# Patient Record
Sex: FEMALE | Race: OTHER | NOT HISPANIC OR LATINO | URBAN - METROPOLITAN AREA
[De-identification: names, ages, dates, MRNs, and addresses within clinical notes are randomized per-mention and may not be internally consistent; named-entity substitution may affect disease eponyms.]

---

## 2020-11-06 ENCOUNTER — OUTPATIENT (OUTPATIENT)
Dept: OUTPATIENT SERVICES | Facility: HOSPITAL | Age: 45
LOS: 1 days | Discharge: HOME | End: 2020-11-06
Payer: COMMERCIAL

## 2020-11-06 VITALS
HEIGHT: 65 IN | SYSTOLIC BLOOD PRESSURE: 120 MMHG | TEMPERATURE: 98 F | OXYGEN SATURATION: 98 % | RESPIRATION RATE: 16 BRPM | DIASTOLIC BLOOD PRESSURE: 77 MMHG | WEIGHT: 131.84 LBS | HEART RATE: 69 BPM

## 2020-11-06 DIAGNOSIS — Z98.890 OTHER SPECIFIED POSTPROCEDURAL STATES: Chronic | ICD-10-CM

## 2020-11-06 DIAGNOSIS — Z98.82 BREAST IMPLANT STATUS: Chronic | ICD-10-CM

## 2020-11-06 LAB
BLD GP AB SCN SERPL QL: NEGATIVE — SIGNIFICANT CHANGE UP
RH IG SCN BLD-IMP: POSITIVE — SIGNIFICANT CHANGE UP

## 2020-11-06 PROCEDURE — 88304 TISSUE EXAM BY PATHOLOGIST: CPT | Mod: 26

## 2020-11-06 PROCEDURE — 88307 TISSUE EXAM BY PATHOLOGIST: CPT | Mod: 26

## 2020-11-06 RX ORDER — MAGNESIUM SULFATE 500 MG/ML
2 VIAL (ML) INJECTION
Refills: 0 | Status: COMPLETED | OUTPATIENT
Start: 2020-11-06 | End: 2020-11-06

## 2020-11-06 RX ORDER — KETOROLAC TROMETHAMINE 30 MG/ML
30 SYRINGE (ML) INJECTION EVERY 6 HOURS
Refills: 0 | Status: DISCONTINUED | OUTPATIENT
Start: 2020-11-07 | End: 2020-11-07

## 2020-11-06 RX ORDER — SODIUM CHLORIDE 9 MG/ML
1000 INJECTION, SOLUTION INTRAVENOUS
Refills: 0 | Status: DISCONTINUED | OUTPATIENT
Start: 2020-11-06 | End: 2020-11-07

## 2020-11-06 RX ORDER — ACETAMINOPHEN 500 MG
1000 TABLET ORAL EVERY 8 HOURS
Refills: 0 | Status: DISCONTINUED | OUTPATIENT
Start: 2020-11-07 | End: 2020-11-07

## 2020-11-06 RX ORDER — QUETIAPINE FUMARATE 200 MG/1
50 TABLET, FILM COATED ORAL EVERY 24 HOURS
Refills: 0 | Status: DISCONTINUED | OUTPATIENT
Start: 2020-11-06 | End: 2020-11-07

## 2020-11-06 RX ORDER — SIMETHICONE 80 MG/1
80 TABLET, CHEWABLE ORAL EVERY 6 HOURS
Refills: 0 | Status: DISCONTINUED | OUTPATIENT
Start: 2020-11-06 | End: 2020-11-07

## 2020-11-06 RX ORDER — HYDROMORPHONE HYDROCHLORIDE 2 MG/ML
0.5 INJECTION INTRAMUSCULAR; INTRAVENOUS; SUBCUTANEOUS
Refills: 0 | Status: DISCONTINUED | OUTPATIENT
Start: 2020-11-06 | End: 2020-11-07

## 2020-11-06 RX ORDER — ONDANSETRON 8 MG/1
8 TABLET, FILM COATED ORAL EVERY 8 HOURS
Refills: 0 | Status: DISCONTINUED | OUTPATIENT
Start: 2020-11-06 | End: 2020-11-07

## 2020-11-06 RX ORDER — ONDANSETRON 8 MG/1
4 TABLET, FILM COATED ORAL ONCE
Refills: 0 | Status: COMPLETED | OUTPATIENT
Start: 2020-11-06 | End: 2020-11-06

## 2020-11-06 RX ORDER — CLONAZEPAM 1 MG
1 TABLET ORAL EVERY 24 HOURS
Refills: 0 | Status: DISCONTINUED | OUTPATIENT
Start: 2020-11-06 | End: 2020-11-07

## 2020-11-06 RX ADMIN — Medication 100 GRAM(S): at 16:00

## 2020-11-06 RX ADMIN — ONDANSETRON 4 MILLIGRAM(S): 8 TABLET, FILM COATED ORAL at 21:11

## 2020-11-06 RX ADMIN — Medication 100 GRAM(S): at 15:15

## 2020-11-06 RX ADMIN — HYDROMORPHONE HYDROCHLORIDE 0.5 MILLIGRAM(S): 2 INJECTION INTRAMUSCULAR; INTRAVENOUS; SUBCUTANEOUS at 19:45

## 2020-11-06 RX ADMIN — HYDROMORPHONE HYDROCHLORIDE 0.5 MILLIGRAM(S): 2 INJECTION INTRAMUSCULAR; INTRAVENOUS; SUBCUTANEOUS at 21:07

## 2020-11-06 RX ADMIN — HYDROMORPHONE HYDROCHLORIDE 0.5 MILLIGRAM(S): 2 INJECTION INTRAMUSCULAR; INTRAVENOUS; SUBCUTANEOUS at 19:37

## 2020-11-06 RX ADMIN — Medication 1 MILLIGRAM(S): at 22:42

## 2020-11-06 NOTE — BRIEF OPERATIVE NOTE - NSICDXBRIEFPROCEDURE_GEN_ALL_CORE_FT
PROCEDURES:  Suspension, ligament, uterosacral 06-Nov-2020 17:41:29  Julita Farfan  Cystoscopy 06-Nov-2020 17:40:53  Julita Farfan  Salpingectomy, right 06-Nov-2020 17:40:46  Julita Farfan  Vaginal hysterectomy with cystoscopy 06-Nov-2020 17:40:39  Julita Farfan

## 2020-11-06 NOTE — BRIEF OPERATIVE NOTE - OPERATION/FINDINGS
Cystoscopy performed at start of procedure, bilateral jets visualized. Bladder drained at start of case.   Vaginal hysterectomy and right salpingectomy, unable to visualize left fallopian tube. Normal appearing right and left ovary. Pedicles secured with Vicryl sutures throughout.  Good hemostasis confirmed. Uterosacral ligament suspension. Vaginal cuff closed with interrupted Vicryl sutures.  Cystoscopy performed at end of procedure bilateral jets visualized.  Bladder drained at end of case.   EBL 250cc  UOP 100cc  IVF 1200  Patient tolerated procedure.

## 2020-11-06 NOTE — H&P ADULT - ASSESSMENT
46 yo G0 presents for scheduled vaginal hysterectomy for dysfunctional uterine bleeding and symptomatic fibroids.   - Admit for procedure  - NPO  - IV fluids  - Consent obtained by Attending.

## 2020-11-06 NOTE — H&P ADULT - HISTORY OF PRESENT ILLNESS
46 yo G0 presents for scheduled vaginal hysterectomy for dysfunctional uterine bleeding and symptomatic fibroids. Patient feels well. She is anxious but ready for her surgery.     Denies chest pain, SOB, fevers, chills, headache, vaginal bleeding or any other complaints.

## 2020-11-06 NOTE — H&P ADULT - NSICDXPASTSURGICALHX_GEN_ALL_CORE_FT
PAST SURGICAL HISTORY:  History of abdominoplasty     History of breast augmentation 2017    History of rhinoplasty

## 2020-11-07 VITALS
HEART RATE: 70 BPM | SYSTOLIC BLOOD PRESSURE: 105 MMHG | TEMPERATURE: 98 F | RESPIRATION RATE: 15 BRPM | OXYGEN SATURATION: 95 % | DIASTOLIC BLOOD PRESSURE: 67 MMHG

## 2020-11-07 LAB
ANION GAP SERPL CALC-SCNC: 11 MMOL/L — SIGNIFICANT CHANGE UP (ref 5–17)
BUN SERPL-MCNC: 10 MG/DL — SIGNIFICANT CHANGE UP (ref 7–23)
CALCIUM SERPL-MCNC: 8 MG/DL — LOW (ref 8.4–10.5)
CHLORIDE SERPL-SCNC: 103 MMOL/L — SIGNIFICANT CHANGE UP (ref 96–108)
CO2 SERPL-SCNC: 21 MMOL/L — LOW (ref 22–31)
CREAT SERPL-MCNC: 0.72 MG/DL — SIGNIFICANT CHANGE UP (ref 0.5–1.3)
GLUCOSE SERPL-MCNC: 123 MG/DL — HIGH (ref 70–99)
HCT VFR BLD CALC: 33.1 % — LOW (ref 34.5–45)
HGB BLD-MCNC: 11.1 G/DL — LOW (ref 11.5–15.5)
MCHC RBC-ENTMCNC: 31.6 PG — SIGNIFICANT CHANGE UP (ref 27–34)
MCHC RBC-ENTMCNC: 33.5 GM/DL — SIGNIFICANT CHANGE UP (ref 32–36)
MCV RBC AUTO: 94.3 FL — SIGNIFICANT CHANGE UP (ref 80–100)
NRBC # BLD: 0 /100 WBCS — SIGNIFICANT CHANGE UP (ref 0–0)
PLATELET # BLD AUTO: 241 K/UL — SIGNIFICANT CHANGE UP (ref 150–400)
POTASSIUM SERPL-MCNC: 4.2 MMOL/L — SIGNIFICANT CHANGE UP (ref 3.5–5.3)
POTASSIUM SERPL-SCNC: 4.2 MMOL/L — SIGNIFICANT CHANGE UP (ref 3.5–5.3)
RBC # BLD: 3.51 M/UL — LOW (ref 3.8–5.2)
RBC # FLD: 11.7 % — SIGNIFICANT CHANGE UP (ref 10.3–14.5)
SODIUM SERPL-SCNC: 135 MMOL/L — SIGNIFICANT CHANGE UP (ref 135–145)
WBC # BLD: 16.12 K/UL — HIGH (ref 3.8–10.5)
WBC # FLD AUTO: 16.12 K/UL — HIGH (ref 3.8–10.5)

## 2020-11-07 PROCEDURE — 86900 BLOOD TYPING SEROLOGIC ABO: CPT

## 2020-11-07 PROCEDURE — 86901 BLOOD TYPING SEROLOGIC RH(D): CPT

## 2020-11-07 PROCEDURE — 80048 BASIC METABOLIC PNL TOTAL CA: CPT

## 2020-11-07 PROCEDURE — 36415 COLL VENOUS BLD VENIPUNCTURE: CPT

## 2020-11-07 PROCEDURE — 86850 RBC ANTIBODY SCREEN: CPT

## 2020-11-07 PROCEDURE — 88307 TISSUE EXAM BY PATHOLOGIST: CPT

## 2020-11-07 PROCEDURE — 85027 COMPLETE CBC AUTOMATED: CPT

## 2020-11-07 PROCEDURE — 58267 VAG HYST W/URINARY REPAIR: CPT

## 2020-11-07 PROCEDURE — 88304 TISSUE EXAM BY PATHOLOGIST: CPT

## 2020-11-07 RX ORDER — CLONAZEPAM 1 MG
1 TABLET ORAL
Qty: 0 | Refills: 0 | DISCHARGE
Start: 2020-11-07

## 2020-11-07 RX ORDER — TRAMADOL HYDROCHLORIDE 50 MG/1
1 TABLET ORAL
Qty: 9 | Refills: 0
Start: 2020-11-07 | End: 2020-11-09

## 2020-11-07 RX ORDER — ACETAMINOPHEN 500 MG
2 TABLET ORAL
Qty: 0 | Refills: 0 | DISCHARGE
Start: 2020-11-07

## 2020-11-07 RX ORDER — HYDROMORPHONE HYDROCHLORIDE 2 MG/ML
0.5 INJECTION INTRAMUSCULAR; INTRAVENOUS; SUBCUTANEOUS ONCE
Refills: 0 | Status: DISCONTINUED | OUTPATIENT
Start: 2020-11-07 | End: 2020-11-07

## 2020-11-07 RX ORDER — QUETIAPINE FUMARATE 200 MG/1
1 TABLET, FILM COATED ORAL
Qty: 0 | Refills: 0 | DISCHARGE
Start: 2020-11-07

## 2020-11-07 RX ORDER — SIMETHICONE 80 MG/1
1 TABLET, CHEWABLE ORAL
Qty: 0 | Refills: 0 | DISCHARGE
Start: 2020-11-07

## 2020-11-07 RX ADMIN — Medication 30 MILLIGRAM(S): at 00:35

## 2020-11-07 RX ADMIN — Medication 30 MILLIGRAM(S): at 06:30

## 2020-11-07 RX ADMIN — Medication 30 MILLIGRAM(S): at 00:45

## 2020-11-07 RX ADMIN — ONDANSETRON 8 MILLIGRAM(S): 8 TABLET, FILM COATED ORAL at 00:34

## 2020-11-07 RX ADMIN — HYDROMORPHONE HYDROCHLORIDE 0.5 MILLIGRAM(S): 2 INJECTION INTRAMUSCULAR; INTRAVENOUS; SUBCUTANEOUS at 02:06

## 2020-11-07 RX ADMIN — Medication 30 MILLIGRAM(S): at 12:10

## 2020-11-07 RX ADMIN — HYDROMORPHONE HYDROCHLORIDE 0.5 MILLIGRAM(S): 2 INJECTION INTRAMUSCULAR; INTRAVENOUS; SUBCUTANEOUS at 02:38

## 2020-11-07 RX ADMIN — Medication 30 MILLIGRAM(S): at 05:31

## 2020-11-07 RX ADMIN — QUETIAPINE FUMARATE 50 MILLIGRAM(S): 200 TABLET, FILM COATED ORAL at 00:58

## 2020-11-07 RX ADMIN — HYDROMORPHONE HYDROCHLORIDE 0.5 MILLIGRAM(S): 2 INJECTION INTRAMUSCULAR; INTRAVENOUS; SUBCUTANEOUS at 09:49

## 2020-11-07 RX ADMIN — HYDROMORPHONE HYDROCHLORIDE 0.5 MILLIGRAM(S): 2 INJECTION INTRAMUSCULAR; INTRAVENOUS; SUBCUTANEOUS at 09:34

## 2020-11-07 NOTE — PROGRESS NOTE ADULT - ASSESSMENT
Assessment and Plan:  46yo POD1 s/p TVH, RS, cysto, uterosacral ligament suspension, stable    Plan:  1. Vital signs stable, continue to monitor per protocol  2. Pain control tylenol/toradol  3. DVT prophylaxis: SCDs  4. CV: IVF  5. Pulm: Incentive spirometer (at least 10 times per hour while awake)   6. GI: Diet advanced to regular  7. : voiding spontaneously  8. Follow up labs: AM CBC  9. Activity:ambulate as tolerated

## 2020-11-07 NOTE — DISCHARGE NOTE NURSING/CASE MANAGEMENT/SOCIAL WORK - NSDCPNINST_GEN_ALL_CORE
Call Dr. Finkelstein if you have any questions or concerns. Due to pandemic corona virus- please continue proper handwashing/ hand hygiene, wearing mask, social distancing.

## 2020-11-07 NOTE — PROGRESS NOTE ADULT - SUBJECTIVE AND OBJECTIVE BOX
GYN PROGRESS NOTE    Patient evaluated at the bedside. No acute events.  Denies CP/SOB/dizziness/nausea/vomiting/abdominal pain/calf pain.  Pain well controlled on pain medications. Patient is ambulating independently, passing flatus and tolerating a regular diet.    O:   T(C): 36.9 (11-07-20 @ 04:57), Max: 37.3 (11-06-20 @ 19:30)  HR: 77 (11-07-20 @ 04:57) (61 - 89)  BP: 100/63 (11-07-20 @ 04:57) (100/63 - 125/67)  RR: 17 (11-07-20 @ 04:57) (8 - 20)  SpO2: 94% (11-07-20 @ 04:57) (93% - 100%)  Wt(kg): --    GEN: patient appears well  LUNGS: no respiratory distress  ABD: soft, nondistended, appropriately tender, +BS, no rebound or guarding  Pelvic: no vaginal bleeding  EXT: no calf tenderness        11-06 @ 07:01  -  11-07 @ 06:38  --------------------------------------------------------  IN: 810 mL / OUT: 750 mL / NET: 60 mL

## 2020-11-07 NOTE — DISCHARGE NOTE NURSING/CASE MANAGEMENT/SOCIAL WORK - PATIENT PORTAL LINK FT
You can access the FollowMyHealth Patient Portal offered by [facility] by registering at the following website: [email or web address]. By joining [facility]’s FollowMyHealth portal, you will also be able to view your health information using other applications (apps) compatible with our system.

## 2020-11-07 NOTE — DISCHARGE NOTE PROVIDER - NSDCMRMEDTOKEN_GEN_ALL_CORE_FT
acetaminophen 500 mg oral tablet: 2 tab(s) orally every 8 hours  clonazePAM 1 mg oral tablet: 1 tab(s) orally every 24 hours, As needed, Anxiety  QUEtiapine 50 mg oral tablet: 1 tab(s) orally every 24 hours, As needed, Insomnia  simethicone 80 mg oral tablet, chewable: 1 tab(s) orally every 6 hours, As needed, Gas  traMADol 50 mg oral tablet: 1 tab(s) orally every 8 hours MDD:3   acetaminophen 500 mg oral tablet: 2 tab(s) orally every 8 hours, As Needed  clonazePAM 1 mg oral tablet: 1 tab(s) orally every 24 hours, As needed, Anxiety  QUEtiapine 50 mg oral tablet: 1 tab(s) orally every 24 hours, As needed, Insomnia  simethicone 80 mg oral tablet, chewable: 1 tab(s) orally every 6 hours, As needed, Gas  traMADol 50 mg oral tablet: 1 tab(s) orally every 8 hours, As Needed MDD:3

## 2020-11-07 NOTE — PROGRESS NOTE ADULT - ASSESSMENT
46 yo POD0 s/p TVH, RS, cysto, uterosacral ligament suspension, doing well post op, clinically stable    Plan:  1. Vital signs stable, continue to monitor per protocol  2. Pain control tylenol/toradol  3. DVT prophylaxis: SCDs  4. CV: IVF  5. Pulm: Incentive spirometer (at least 10 times per hour while awake)   6. GI: Diet advanced to regular  7. : voiding spontaneously  8. Follow up labs: AM CBC  9. Activity: bedrest tonight

## 2020-11-07 NOTE — PROVIDER CONTACT NOTE (OTHER) - SITUATION
prescription medication Tramadol was picked up from [facility] and given to patient. Medication education given and had verbalized her understanding. Primary nurse Ban mathews.

## 2020-11-07 NOTE — PROGRESS NOTE ADULT - SUBJECTIVE AND OBJECTIVE BOX
GYN Post Op Check     Patient seen at bedside. Doing well.  Only c/o right sided thigh pain likely from positioning intraop. Pain controlled. Tolerating sips. Ambulated to bathroom and passed TOV.     Denies CP, palpitations, SOB, fever, chills, nausea, vomiting.    Vital Signs Last 24 Hrs  T(C): 36.8 (06 Nov 2020 23:31), Max: 37.3 (06 Nov 2020 19:30)  T(F): 98.3 (06 Nov 2020 23:31), Max: 99.2 (06 Nov 2020 19:30)  HR: 61 (06 Nov 2020 23:31) (61 - 89)  BP: 109/72 (06 Nov 2020 23:31) (104/65 - 125/67)  BP(mean): 78 (06 Nov 2020 21:53) (78 - 87)  RR: 17 (06 Nov 2020 23:31) (8 - 20)  SpO2: 99% (06 Nov 2020 23:31) (93% - 100%)    Physical Exam:  Gen: No Acute Distress  Pulm: Clear to auscultation bilaterally  GI: soft, appropriately tender, nondistended, decreased BS, no rebound, no guarding.  Pelvic: no vaginal bleeding  Ext: SCDs in place, wnl    I&O's Summary    06 Nov 2020 07:01  -  07 Nov 2020 00:01  --------------------------------------------------------  IN: 540 mL / OUT: 200 mL / NET: 340 mL      MEDICATIONS  (STANDING):  lactated ringers. 1000 milliLiter(s) (90 mL/Hr) IV Continuous <Continuous>    MEDICATIONS  (PRN):  clonazePAM  Tablet 1 milliGRAM(s) Oral every 24 hours PRN Anxiety  HYDROmorphone  Injectable 0.5 milliGRAM(s) IV Push every 15 minutes PRN Severe Pain (7 - 10)  ondansetron    Tablet 8 milliGRAM(s) Oral every 8 hours PRN Nausea and/or Vomiting  QUEtiapine 50 milliGRAM(s) Oral every 24 hours PRN Insomnia  simethicone 80 milliGRAM(s) Chew every 6 hours PRN Gas    Allergies    codeine (Anaphylaxis)  hazelnuts (Unknown)    Intolerances        LABS:

## 2020-11-07 NOTE — DISCHARGE NOTE PROVIDER - CARE PROVIDER_API CALL
Finkelstein, Seth A  OBSTETRICS AND GYNECOLOGY  [address] [place] [address]  Phone: [phone]  Fax: [phone]  Follow Up Time:

## 2020-11-07 NOTE — DISCHARGE NOTE PROVIDER - HOSPITAL COURSE
Patient is a 45 year old who presented for scheduled surgery and underwent a vaginal hysterectomy, cystoscopy, uterosacral ligament suspension for abnormal uterine bleeding. POD#1 H/H was appropriate. On day of discharge, patient was meeting all postoperative milestones. Plan is for patient to follow up with private Gyn.

## 2020-11-07 NOTE — DISCHARGE NOTE PROVIDER - NSDCCPCAREPLAN_GEN_ALL_CORE_FT
PRINCIPAL DISCHARGE DIAGNOSIS  Diagnosis: Abnormal uterine bleeding  Assessment and Plan of Treatment:

## 2020-11-16 LAB — SURGICAL PATHOLOGY STUDY: SIGNIFICANT CHANGE UP
